# Patient Record
Sex: MALE
[De-identification: names, ages, dates, MRNs, and addresses within clinical notes are randomized per-mention and may not be internally consistent; named-entity substitution may affect disease eponyms.]

---

## 2018-01-08 ENCOUNTER — HOSPITAL ENCOUNTER (OUTPATIENT)
Dept: HOSPITAL 5 - SPVWC | Age: 83
Discharge: HOME | End: 2018-01-08
Attending: INTERNAL MEDICINE
Payer: MEDICARE

## 2018-01-08 DIAGNOSIS — R97.21: Primary | ICD-10-CM

## 2018-01-08 DIAGNOSIS — N28.1: ICD-10-CM

## 2018-01-08 PROCEDURE — 76705 ECHO EXAM OF ABDOMEN: CPT

## 2018-01-08 NOTE — ULTRASOUND REPORT
Limited abdominal ultrasound:



Prostate cancer and elevated bilirubin.



Images of the liver demonstrate a generally normal size and echo 

pattern. There is no focal finding. The gallbladder is echogenically 

unremarkable and the CBD diameter is 3 mm. The body and head of the 

pancreas are visualized and appear somewhat echogenic but the tail is 

obscured. The right renal length is 11.5 cm. In the superior pole there 

is an echolucent mass measuring 3.6 cm consistent with a cyst. The 

kidney is not otherwise remarkable. The transverse diameter of the 

proximal abdominal aorta is 1.7 cm.



Impressions:



Probable fatty changes of the pancreas.



Superior pole right renal cyst.

## 2018-04-23 ENCOUNTER — HOSPITAL ENCOUNTER (OUTPATIENT)
Dept: HOSPITAL 5 - NM | Age: 83
Discharge: HOME | End: 2018-04-23
Attending: INTERNAL MEDICINE
Payer: MEDICARE

## 2018-04-23 DIAGNOSIS — J98.4: ICD-10-CM

## 2018-04-23 DIAGNOSIS — R90.82: ICD-10-CM

## 2018-04-23 DIAGNOSIS — I70.0: ICD-10-CM

## 2018-04-23 DIAGNOSIS — E07.9: ICD-10-CM

## 2018-04-23 DIAGNOSIS — M47.895: ICD-10-CM

## 2018-04-23 DIAGNOSIS — N28.1: ICD-10-CM

## 2018-04-23 DIAGNOSIS — K57.30: ICD-10-CM

## 2018-04-23 DIAGNOSIS — N40.0: Primary | ICD-10-CM

## 2018-04-23 DIAGNOSIS — N21.0: ICD-10-CM

## 2018-04-23 DIAGNOSIS — R97.20: ICD-10-CM

## 2018-04-23 DIAGNOSIS — G31.9: ICD-10-CM

## 2018-04-23 LAB — BUN SERPL-MCNC: 16 MG/DL (ref 9–20)

## 2018-04-23 PROCEDURE — 36415 COLL VENOUS BLD VENIPUNCTURE: CPT

## 2018-04-23 PROCEDURE — 70470 CT HEAD/BRAIN W/O & W/DYE: CPT

## 2018-04-23 PROCEDURE — 71260 CT THORAX DX C+: CPT

## 2018-04-23 PROCEDURE — A9503 TC99M MEDRONATE: HCPCS

## 2018-04-23 PROCEDURE — 74177 CT ABD & PELVIS W/CONTRAST: CPT

## 2018-04-23 PROCEDURE — 84520 ASSAY OF UREA NITROGEN: CPT

## 2018-04-23 PROCEDURE — 78306 BONE IMAGING WHOLE BODY: CPT

## 2018-04-23 PROCEDURE — 82565 ASSAY OF CREATININE: CPT

## 2018-04-23 NOTE — CAT SCAN REPORT
CT HEAD WITH AND WITHOUT CONTRAST:



HISTORY:  Left temporal bone mass.



Serial contiguous axial images were obtained through the cranium,

both before and after the administration of intravenous contrast

material. The ventricles are normal in size and appearance. There is no 

mass effect or midline shift.  No areas of abnormally increased or 

decreased attenuation are seen.  No mass lesion is seen. Mild diffuse 

cortical atrophy and mild chronic white matter changes are identified 

which appear appropriate for this persons age.



The mastoid air cells and visualized portions of the sinuses are

normal.



IMPRESSION:

Senescent changes. No acute process. No left temporal bone mass is 

detected on CT.

## 2018-04-24 NOTE — CAT SCAN REPORT
CT ABDOMEN PELVIS WITH CONTRAST:



HISTORY:  Elevated PSA, prostate cancer.



COMPARISON: PET CT dated 7/21/16.



TECHNIQUE:  Helical CT in 1.25mm intervals following IV contrast. 

Sagittal and coronal reconstructions. 





FINDINGS:



Liver: Normal.



Biliary system: Normal.



Pancreas: Normal.



Spleen: Normal.



Kidneys/ureters/bladder: 4.3 cm simple cyst in the superior right 

kidney is unchanged. 2.4 cm intermediate density cyst adjacent to the 

cyst in the superior right kidney is also unchanged and is most 

consistent with a hemorrhagic cyst. The left kidney is unremarkable 

other than a focal area of cortical scarring in the posterior left 

kidney which is unchanged. The ureters are normal course and caliber. 

The bladder is partially empty. A 4 mm bladder stone is noted in the 

right lateral bladder near the right UVJ. There were numerous bladder 

stones on the previous PET CT which are no longer present. Moderate 

enlargement of the prostate gland measuring up to 7.2 cm in diameter is 

unchanged.



Adrenal glands: Normal.



Aorta: Mild scattered calcifications. No aneurysm or stenosis.



Intestines: Moderate sigmoid diverticulosis is unchanged. There is no 

evidence for bowel obstruction or inflammatory change. No obvious GI 

mass.



Appendix: Not identified, correlate with surgical history.



Ascites: None.



Adenopathy: No pathologic adenopathy identified.



Musculoskeletal: The bony structures are intact. Mild lumbar 

spondylosis is noted. No suspicious bony lesion is identified.





IMPRESSION:

Enlarged prostate measuring 7.2 cm in diameter.

No evidence for metastatic disease to the abdomen or pelvis.

Right renal cysts as described.

Sigmoid diverticulosis.

4 mm bladder stone.

## 2018-04-24 NOTE — CAT SCAN REPORT
CT CHEST WITH CONTRAST:



HISTORY: PSA, prostate cancer.



COMPARISON: PET CT dated 7/21/16.



TECHNIQUE: Helical CT in 1.25mm intervals following IV contrast. 

Sagittal and coronal reformatted images.  





FINDINGS:





Thyroid gland: The thyroid gland appears atrophic which is unchanged. 

There is a calcified masslike lesion just posterior and inferior to the 

left thyroid lobe measuring 2.8 x 1.9 cm in axial plane which is also 

unchanged. It is unclear if this represents an exophytic thyroid 

lesion, calcified lymph nodes or other lesion. I suspect a benign 

etiology.



Tracheobronchial tree: Normal.



Esophagus: Normal.



Heart: Normal size heart. Mild aortic valve calcifications are noted.



Pericardium: Normal.



Mediastinum: No mediastinal mass or inflammation. The aorta is normal 

caliber and contains mild scattered calcific plaques.



Lung Fields: No evidence for pulmonary nodule, mass or infiltrate. 1.6 

cm right middle lobe parenchymal lung cyst is noted. There is evidence 

for mild air trapping in both lungs which could be related to mild COPD.



Pleural Spaces: Normal.



Musculoskeletal: The bony structures are intact. Mild multilevel 

thoracic spondylosis is noted. No suspicious bony lesions are 

identified.





IMPRESSION: 

No evidence for metastatic disease to the chest.

Chronic findings as outlined above which appear unchanged since the PET 

CT dated 7/21/16.

## 2018-04-24 NOTE — NUCLEAR MEDICINE REPORT
BONE SCAN:



History: Elevated prostate specific antigen.



After injection of isotope, gamma camera imaging of the bony

system was done. There is physiologic renal and soft tissue uptake.



There are 3 patchy areas slightly increased radiotracer uptake in the 

frontal regions of the calvarium bilaterally. The etiology of this is 

unclear. No suspicious bony lesions are identified on CT head with and 

without contrast performed the same day.



There is moderate degenerative uptake in the shoulders, 

sternoclavicular joints, knees and feet. Mild uptake throughout the 

maxillary bone is consistent with periodontal disease.



No focal abnormal uptake is identified to suggest metastatic disease to 

the bones.



IMPRESSION:

Negative bone scan.

Degenerative changes.

Subtle patchy uptake in the frontal regions of the calvarium as 

outlined above.

## 2019-04-30 ENCOUNTER — HOSPITAL ENCOUNTER (OUTPATIENT)
Dept: HOSPITAL 5 - NM | Age: 84
Discharge: HOME | End: 2019-04-30
Attending: INTERNAL MEDICINE
Payer: MEDICARE

## 2019-04-30 DIAGNOSIS — N28.1: Primary | ICD-10-CM

## 2019-04-30 DIAGNOSIS — R97.20: ICD-10-CM

## 2019-04-30 DIAGNOSIS — J98.59: ICD-10-CM

## 2019-04-30 DIAGNOSIS — N40.0: ICD-10-CM

## 2019-04-30 DIAGNOSIS — Z85.46: ICD-10-CM

## 2019-04-30 DIAGNOSIS — R22.0: ICD-10-CM

## 2019-04-30 PROCEDURE — 78306 BONE IMAGING WHOLE BODY: CPT

## 2019-04-30 PROCEDURE — 70470 CT HEAD/BRAIN W/O & W/DYE: CPT

## 2019-04-30 PROCEDURE — A9503 TC99M MEDRONATE: HCPCS

## 2019-04-30 PROCEDURE — 74177 CT ABD & PELVIS W/CONTRAST: CPT

## 2019-04-30 PROCEDURE — 71260 CT THORAX DX C+: CPT

## 2019-04-30 NOTE — CAT SCAN REPORT
CT HEAD WITH AND WITHOUT CONTRAST:



HISTORY:  Elevated prostate specific antigen.



Serial contiguous axial images were obtained through the cranium,

both before and after the administration of intravenous contrast

material. The ventricles are normal in size and appearance. There is no 

mass effect or midline shift.  No areas of abnormally increased or 

decreased attenuation are seen.  No mass lesion is seen.



The mastoid air cells and visualized portions of the sinuses are

normal.



IMPRESSION:

Cranial CT scan within normal limits.

## 2019-04-30 NOTE — CAT SCAN REPORT
PROCEDURE: CT abdomen and pelvis with contrast. 

 

TECHNIQUE:  Computerized axial tomography of the abdomen and pelvis was performed after the IV inject
ion of iodinated nonionic contrast.   

 

CT DOSE LENGTH PRODUCT:  Not provided  mGycm 

 

HISTORY: ELEVATED PROSTATE SPECIFIC ANTIGEN 

 

COMPARISONS:  CT abdomen and pelvis 4/22/2018. Dictation not available. 

 

FINDINGS: 

 

The lung bases are grossly clear. There are no pleural effusions. The heart size is normal. The liver
, pancreas and spleen appear normal. The gallbladder is present. There is no biliary dilatation. The 
adrenal glands are not enlarged. There are 3 cysts in the right kidney. One of these cysts has higher
 attenuation. The abdominal aorta has a normal caliber. There is no retroperitoneal adenopathy. The g
astrointestinal tract is unremarkable. The appendix is not visualized. There are approximately 3 smal
l calcifications within the bladder. The seminal vesicles appear normal. The prostate is enlarged. Th
e regional skeleton appears intact. There is probably a right hydrocele present. 

 

IMPRESSION:  Right renal cysts, one of which is hyperdense. Enlarged prostate. Probable right scrotal
 hydrocele. 

 

This document is electronically signed by Qamar Cali MD., April 30 2019 04:52:12 PM ET

## 2019-04-30 NOTE — CAT SCAN REPORT
PROCEDURE: CT CHEST W CON 

 

TECHNIQUE:  CT chest with intravenous contrast 

 

HISTORY: ELEVATED PROSTATE SPECIFIC ANTIGEN 

 

COMPARISONS: Comparison is dated April 23, 2018  

 

FINDINGS: 

 

Partially calcified focus in the upper mediastinum again noted this is unchanged and appears benign. 


 

No evidence for pathologic lymph node enlargement 

 

Heart and great vessels are unremarkable. 

 

No focal pulmonary infiltrate identified. No pleural fluid collection seen. 

 

No lytic or blastic bony lesions are identified. 

 

Visualized upper abdomen is unremarkable. 

 

IMPRESSION:  

 

Negative no acute findings.. 

 

This document is electronically signed by Santos Manning MD., April 30 2019 05:34:56 PM ET

## 2019-05-01 NOTE — NUCLEAR MEDICINE REPORT
NUCLEAR MEDICINE WHOLE-BODY BONE SCAN: 04/30/19 



CLINICAL: History of prostate CA and elevated PSA (7).



COMPARISON: 04/23/18



TECHNIQUE: 25 millicuries technetium 99m MDP was injected intravenously 

and whole body scans were obtained at 3 hours.



FINDINGS: 3 patchy areas of increased uptake in the prior region of the 

calvarium bilaterally are not significantly changed compared to the 

last exam.  The distribution of radionuclide in the skeleton and soft 

tissues is otherwise normal.  No suspicious uptake.



IMPRESSION: Negative bone scan with stable subtle patchy nonspecific 

uptake in the calvarium.